# Patient Record
Sex: FEMALE | Race: OTHER
[De-identification: names, ages, dates, MRNs, and addresses within clinical notes are randomized per-mention and may not be internally consistent; named-entity substitution may affect disease eponyms.]

---

## 2024-08-19 ENCOUNTER — RESULT REVIEW (OUTPATIENT)
Age: 84
End: 2024-08-19

## 2024-08-21 ENCOUNTER — TRANSCRIPTION ENCOUNTER (OUTPATIENT)
Age: 84
End: 2024-08-21

## 2024-08-22 ENCOUNTER — TRANSCRIPTION ENCOUNTER (OUTPATIENT)
Age: 84
End: 2024-08-22

## 2024-08-26 ENCOUNTER — APPOINTMENT (OUTPATIENT)
Dept: CARE COORDINATION | Facility: HOME HEALTH | Age: 84
End: 2024-08-26
Payer: MEDICARE

## 2024-08-26 DIAGNOSIS — E03.9 HYPOTHYROIDISM, UNSPECIFIED: ICD-10-CM

## 2024-08-26 DIAGNOSIS — E78.5 HYPERLIPIDEMIA, UNSPECIFIED: ICD-10-CM

## 2024-08-26 DIAGNOSIS — R79.89 OTHER SPECIFIED ABNORMAL FINDINGS OF BLOOD CHEMISTRY: ICD-10-CM

## 2024-08-26 DIAGNOSIS — K21.9 GASTRO-ESOPHAGEAL REFLUX DISEASE W/OUT ESOPHAGITIS: ICD-10-CM

## 2024-08-26 PROBLEM — Z00.00 ENCOUNTER FOR PREVENTIVE HEALTH EXAMINATION: Status: ACTIVE | Noted: 2024-08-26

## 2024-08-26 PROCEDURE — 99349 HOME/RES VST EST MOD MDM 40: CPT

## 2024-08-26 RX ORDER — FAMOTIDINE 40 MG/1
40 TABLET, FILM COATED ORAL
Refills: 0 | Status: ACTIVE | COMMUNITY

## 2024-08-26 RX ORDER — LEVOTHYROXINE SODIUM 0.17 MG/1
TABLET ORAL
Refills: 0 | Status: ACTIVE | COMMUNITY

## 2024-08-26 RX ORDER — OMEPRAZOLE 40 MG/1
CAPSULE, DELAYED RELEASE ORAL
Refills: 0 | Status: ACTIVE | COMMUNITY

## 2024-08-26 RX ORDER — ASPIRIN 81 MG/1
81 TABLET ORAL
Refills: 0 | Status: ACTIVE | COMMUNITY

## 2024-08-26 RX ORDER — SIMVASTATIN 40 MG/1
TABLET, FILM COATED ORAL
Refills: 0 | Status: ACTIVE | COMMUNITY

## 2024-08-26 NOTE — HISTORY OF PRESENT ILLNESS
[Home] : at home, [unfilled] , at the time of the visit. [Other Location: e.g. Home (Enter Location, City,State)___] : at [unfilled] [Verbal consent obtained from patient] : the patient, [unfilled] [Family Member] : family member [FreeTextEntry1] : Follow-up for discharge from [facility] for AMI.  [de-identified] : Patient is a 83 year old female enrolled in the [facility] program with a history of HLD, hypothyroidism, GERD, and IBS. Patient was admitted to  for AMI.    Hospital chart reviewed and copied as per [facility] Discharge Summary: "83F PMH HLD, hypothyroidism, GERD, and IBS who presents to the ED with left-sided abdominal pain for the past 36 hours. States that she had been dealing with abdominal pain for the past day or so that wouldn't improve. Didn't think too much of it because she has GERD and IBS, but eventually she began to note dyspnea on exertion that later became present even at rest with associated lower extremity swelling. With the leg swelling and dyspnea, she reached out to her daughter who brought her to the ED. Uses a walker at baseline and had been independent until about 2 weeks ago when she began to have lower extremity weakness. Saw her orthopedist who gave her shots in the knees to improve mobility and now has PT at home. Denies chest pain, recent fever/chills, dysuria, and changes in bowel movement. In the ED, initial vitals were stable. Lab work was pertinent for troponin of 418.8 to 392.3. EKG revealed sinus rhythm with 1st degree AV block".    Patient observed via tele visit and is alert and oriented x 3, in no acute distress. Patient observed sitting comfortably upright on the couch with daughter present at time of visit. Patient states they are feeling well today. Patient states they are eating and drinking well. Reports she woke up with a mild headache but has improved. States she has f/u pending with cardiology Dr. Renee. Patient reports she has been seen by [facility] and has referrals in place for PT and OT. Denies any cough, fever, chills, abdominal pain, palpitations, nausea, vomiting, diarrhea, lightheadedness, dizziness, shortness of breath, or chest pain.

## 2024-08-26 NOTE — PLAN
[FreeTextEntry1] : -CV and pulmonary status stable -Patient advised to continue with medication regimen as directed -Medication education discussed in full detail with + teach back. -Encouraged verbalization of fears and concerns. -Report all symptoms not relieved by rest or medication -Educated on monitoring blood pressure -Maintain Balanced diet -Exercise as appropriate -Patient educated on s/s of when to call medical providers with + teach back. -Reminded of NP role and advised to call with any questions/concerns. -Follow up with medical providers as scheduled   - Patient verbalized understanding of plan above, advised to call TCM Team or CCC with any questions or concerns.

## 2024-08-26 NOTE — ASSESSMENT
[FreeTextEntry1] : Patient is a 83 year old female enrolled in the [facility] with a history of HLD, hypothyroidism, GERD, and IBS. Patient was admitted to [facility] for AMI.    Hospital chart reviewed and copied as per [facility] Discharge Summary: "83F PMH HLD, hypothyroidism, GERD, and IBS who presents to the ED with left-sided abdominal pain for the past 36 hours. States that she had been dealing with abdominal pain for the past day or so that wouldn't improve. Didn't think too much of it because she has GERD and IBS, but eventually she began to note dyspnea on exertion that later became present even at rest with associated lower extremity swelling. With the leg swelling and dyspnea, she reached out to her daughter who brought her to the ED. Uses a walker at baseline and had been independent until about 2 weeks ago when she began to have lower extremity weakness. Saw her orthopedist who gave her shots in the knees to improve mobility and now has PT at home. Denies chest pain, recent fever/chills, dysuria, and changes in bowel movement. In the ED, initial vitals were stable. Lab work was pertinent for troponin of 418.8 to 392.3. EKG revealed sinus rhythm with 1st degree AV block".    Patient observed via tele visit and is alert and oriented x 3, in no acute distress. Patient observed sitting comfortably upright on the couch with daughter present at time of visit. Patient states they are feeling well today. Patient states they are eating and drinking well. Reports she woke up with a mild headache but has improved. States she has f/u pending with cardiology Dr. Renee. Patient reports she has been seen by [facility] and has referrals in place for PT and OT. Denies any cough, fever, chills, abdominal pain, palpitations, nausea, vomiting, diarrhea, lightheadedness, dizziness, shortness of breath, or chest pain.

## 2024-08-26 NOTE — PHYSICAL EXAM
[No Acute Distress] : no acute distress [Well Nourished] : well nourished [Well Developed] : well developed [Well-Appearing] : well-appearing [Normal Sclera/Conjunctiva] : normal sclera/conjunctiva [Normal Outer Ear/Nose] : the outer ears and nose were normal in appearance [No JVD] : no jugular venous distention [No Respiratory Distress] : no respiratory distress  [Non-distended] : non-distended [No Joint Swelling] : no joint swelling [No Rash] : no rash [Normal Affect] : the affect was normal [Normal Insight/Judgement] : insight and judgment were intact [de-identified] : limited due to tele visit  [de-identified] : trace edema to ankles  [de-identified] : ambulates with walker

## 2024-08-26 NOTE — REVIEW OF SYSTEMS
[Fever] : no fever [Chills] : no chills [Fatigue] : fatigue [Hot Flashes] : no hot flashes [Night Sweats] : no night sweats [Sore Throat] : sore throat [Chest Pain] : no chest pain [Palpitations] : no palpitations [Leg Claudication] : no leg claudication [Lower Ext Edema] : lower extremity edema [Orthopnea] : no orthopnea [Shortness Of Breath] : no shortness of breath [Wheezing] : no wheezing [Cough] : no cough [Dyspnea on Exertion] : no dyspnea on exertion [Abdominal Pain] : no abdominal pain [Vomiting] : no vomiting [Dysuria] : no dysuria [Incontinence] : no incontinence [Hematuria] : no hematuria [Muscle Weakness] : muscle weakness [Itching] : no itching [Skin Rash] : no skin rash [Headache] : headache [Dizziness] : no dizziness [Fainting] : no fainting [Memory Loss] : no memory loss [Suicidal] : not suicidal [Insomnia] : no insomnia [Anxiety] : no anxiety [Easy Bleeding] : no easy bleeding [Easy Bruising] : no easy bruising [Negative] : Eyes

## 2024-09-04 ENCOUNTER — TRANSCRIPTION ENCOUNTER (OUTPATIENT)
Age: 84
End: 2024-09-04